# Patient Record
Sex: FEMALE | Race: ASIAN | NOT HISPANIC OR LATINO
[De-identification: names, ages, dates, MRNs, and addresses within clinical notes are randomized per-mention and may not be internally consistent; named-entity substitution may affect disease eponyms.]

---
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
## 2022-01-20 ENCOUNTER — APPOINTMENT (OUTPATIENT)
Dept: GASTROENTEROLOGY | Facility: CLINIC | Age: 22
End: 2022-01-20
Payer: MEDICAID

## 2022-01-20 ENCOUNTER — NON-APPOINTMENT (OUTPATIENT)
Age: 22
End: 2022-01-20

## 2022-01-20 ENCOUNTER — OUTPATIENT (OUTPATIENT)
Dept: OUTPATIENT SERVICES | Facility: HOSPITAL | Age: 22
LOS: 1 days | End: 2022-01-20

## 2022-01-20 VITALS
TEMPERATURE: 97.7 F | OXYGEN SATURATION: 98 % | WEIGHT: 159.81 LBS | BODY MASS INDEX: 28.32 KG/M2 | DIASTOLIC BLOOD PRESSURE: 60 MMHG | HEIGHT: 63 IN | HEART RATE: 94 BPM | SYSTOLIC BLOOD PRESSURE: 100 MMHG

## 2022-01-20 DIAGNOSIS — K85.90 ACUTE PANCREATITIS WITHOUT NECROSIS OR INFECTION, UNSPECIFIED: ICD-10-CM

## 2022-01-20 PROCEDURE — 99205 OFFICE O/P NEW HI 60 MIN: CPT | Mod: GC

## 2022-01-20 NOTE — ASSESSMENT
[FreeTextEntry1] : Impression:\par #Pancreatitis of unclear etiology - No EtOH, no e/o gallstones. There is some association with Adderall and pancreatitis in women aged 10-19. No family history, IgG 4 level wnl.\par #Abd pain - unlikely related to pancreatitis, can consider GERD/PUD or possibly biliary colic (gallstones not seen on U/S)\par \par Recommendations:\par - Schedule EGD/EUS to eval for microlithiasis or sludge and also to eval upper abd pain (H pylori biopsies). Patiente prefers this to MRI/MRCP.\par - Famotidine PRN trial for abd pain.\par - Will consider genetics referral is above negative.\par - Informed patient of association between adderall and pancreatitis and informed her of risk of recurrent pancreatitis if she restarts. She will discuss with her prescribing provider.\par - RTC 8 weeks. \par \par D/w Dr. Jimenez

## 2022-01-20 NOTE — HISTORY OF PRESENT ILLNESS
[Heartburn] : denies heartburn [Nausea] : denies nausea [Vomiting] : denies vomiting [Diarrhea] : denies diarrhea [Constipation] : denies constipation [Yellow Skin Or Eyes (Jaundice)] : denies jaundice [Abdominal Pain] : improved abdominal pain [Abdominal Swelling] : denies abdominal swelling [Rectal Pain] : denies rectal pain [Wt Gain ___ Lbs] : no recent weight gain [Wt Loss ___ Lbs] : no recent weight loss [GERD] : no gastroesophageal reflux disease [Hiatus Hernia] : no hiatus hernia [Peptic Ulcer Disease] : no peptic ulcer disease [Pancreatitis] : no pancreatitis [Cholelithiasis] : no cholelithiasis [Kidney Stone] : no kidney stone [Inflammatory Bowel Disease] : no inflammatory bowel disease [Irritable Bowel Syndrome] : no irritable bowel syndrome [Diverticulitis] : no diverticulitis [Alcohol Abuse] : no alcohol abuse [Malignancy] : no malignancy [Abdominal Surgery] : no abdominal surgery [Appendectomy] : no appendectomy [Cholecystectomy] : no cholecystectomy [de-identified] : SHENA GABRIEL is a 21 year F here after recent admission for pancreatitis. She has a history of ADHD and was previously on adderal and lamotrigine, now no longer of medications.\par \par GI History: Patient had 2 weeks of epigastric pain radiating to the back prior to her hospitalization. There diagnosed w/ pancreatitis. Lipase > 1000. Liver enzymes wnl. CT showed mild ana-pancreatic inflammation in the head. RUQ U/S shows no gallstones or sludge, and normal CBD. \par \par She has no family history of pancreatitis or gallstones/cholecystectomy. She never drinks EtOH. She is not on any other OTC meds. \par \par Current symptoms: Since discharge, she had an episode of mild recurrent pain that went away after stopping eating for a few days. Now she occasionally has post prandial epigastric pain that is worse in the evening and lasts for an hour, a little different than her pancreatitis pain. \par \par GI ROS neg for weight loss, f/c, dysphagia, reflux, nausea, vomiting, early satiety, diarrhea, constipation, melena, hematochezia. Patient denies NSAIDs.\par \par Current Meds: none\par All: NKDA\par FHx: As above\par SHx: As above\par \par Relevant Exam:\par Unremarkable\par

## 2022-01-20 NOTE — END OF VISIT
[] : Fellow [FreeTextEntry3] : 21F ADHD on Adderall/Lamictal with episode of pancreatitis (no alcohol, no stones on imaging, IgG4 level normal, TG normal) \par ?drug induced (case reports with Adderall and pancreatitis) vs structural abnormalities of pancreas\par also with abdominal pains\par 1- EUS/EGD\par 2- Counseled pt on suspicion of possible Adderall induced pancreatitis\par

## 2022-03-22 NOTE — ASU PATIENT PROFILE, ADULT - FALL HARM RISK - UNIVERSAL INTERVENTIONS
Bed in lowest position, wheels locked, appropriate side rails in place/Call bell, personal items and telephone in reach/Instruct patient to call for assistance before getting out of bed or chair/Non-slip footwear when patient is out of bed/[place] to call system/Physically safe environment - no spills, clutter or unnecessary equipment/Purposeful Proactive Rounding/Room/bathroom lighting operational, light cord in reach

## 2022-03-22 NOTE — ASU PATIENT PROFILE, ADULT - NS TRANSFER PATIENT BELONGINGS
Electronic Device (specify) 1 bracelet, 1 necklace/Electronic Device (specify)/Jewelry/Money (specify)/Clothing

## 2022-03-23 ENCOUNTER — OUTPATIENT (OUTPATIENT)
Dept: OUTPATIENT SERVICES | Facility: HOSPITAL | Age: 22
LOS: 1 days | Discharge: HOME | End: 2022-03-23
Payer: MEDICAID

## 2022-03-23 VITALS
RESPIRATION RATE: 14 BRPM | OXYGEN SATURATION: 100 % | HEART RATE: 72 BPM | DIASTOLIC BLOOD PRESSURE: 56 MMHG | SYSTOLIC BLOOD PRESSURE: 94 MMHG

## 2022-03-23 VITALS
HEIGHT: 62 IN | SYSTOLIC BLOOD PRESSURE: 122 MMHG | DIASTOLIC BLOOD PRESSURE: 78 MMHG | HEART RATE: 96 BPM | OXYGEN SATURATION: 99 % | RESPIRATION RATE: 20 BRPM | TEMPERATURE: 98 F | WEIGHT: 164.02 LBS

## 2022-03-23 DIAGNOSIS — K85.90 ACUTE PANCREATITIS WITHOUT NECROSIS OR INFECTION, UNSPECIFIED: ICD-10-CM

## 2022-03-23 LAB — HCG UR QL: NEGATIVE — SIGNIFICANT CHANGE UP

## 2022-03-23 PROCEDURE — 43259 EGD US EXAM DUODENUM/JEJUNUM: CPT | Mod: GC

## 2022-03-23 PROCEDURE — 43239 EGD BIOPSY SINGLE/MULTIPLE: CPT | Mod: 59,GC

## 2022-04-06 ENCOUNTER — APPOINTMENT (OUTPATIENT)
Dept: PEDIATRIC ADOLESCENT MEDICINE | Facility: HOSPITAL | Age: 22
End: 2022-04-06

## 2022-04-06 ENCOUNTER — OUTPATIENT (OUTPATIENT)
Dept: OUTPATIENT SERVICES | Age: 22
LOS: 1 days | End: 2022-04-06

## 2022-04-06 VITALS
TEMPERATURE: 98.3 F | BODY MASS INDEX: 30.11 KG/M2 | HEIGHT: 63 IN | WEIGHT: 169.9 LBS | HEART RATE: 91 BPM | SYSTOLIC BLOOD PRESSURE: 111 MMHG | DIASTOLIC BLOOD PRESSURE: 73 MMHG

## 2022-04-06 DIAGNOSIS — Z23 ENCOUNTER FOR IMMUNIZATION: ICD-10-CM

## 2022-04-06 DIAGNOSIS — H00.019 HORDEOLUM EXTERNUM UNSPECIFIED EYE, UNSPECIFIED EYELID: ICD-10-CM

## 2022-04-06 DIAGNOSIS — B00.9 HERPESVIRAL INFECTION, UNSPECIFIED: ICD-10-CM

## 2022-04-06 PROCEDURE — 90686 IIV4 VACC NO PRSV 0.5 ML IM: CPT

## 2022-04-06 PROCEDURE — 90471 IMMUNIZATION ADMIN: CPT | Mod: 1L

## 2022-04-06 PROCEDURE — 99213 OFFICE O/P EST LOW 20 MIN: CPT | Mod: 25

## 2022-04-06 RX ORDER — POLYMYXIN B SULFATE AND TRIMETHOPRIM 10000; 1 [USP'U]/ML; MG/ML
10000-0.1 SOLUTION OPHTHALMIC
Qty: 1 | Refills: 0 | Status: ACTIVE | COMMUNITY
Start: 2022-04-06 | End: 1900-01-01

## 2022-04-06 RX ORDER — VALACYCLOVIR 1 G/1
1 TABLET, FILM COATED ORAL
Qty: 20 | Refills: 2 | Status: ACTIVE | COMMUNITY
Start: 2017-12-01 | End: 1900-01-01

## 2022-04-08 PROBLEM — B00.9 HSV-1 (HERPES SIMPLEX VIRUS 1) INFECTION: Status: ACTIVE | Noted: 2017-12-01

## 2022-04-08 PROBLEM — Z23 ENCOUNTER FOR IMMUNIZATION: Status: ACTIVE | Noted: 2022-04-06

## 2022-04-08 NOTE — PHYSICAL EXAM
[NL] : clear to auscultation bilaterally [FreeTextEntry5] : right lower eyelash border and inner conjunctiva erythematous nodule  [de-identified] : hyperpigmented macules on lower abdomen and leg

## 2022-04-08 NOTE — HISTORY OF PRESENT ILLNESS
[FreeTextEntry6] : Alexia is a 20yo F with hx here with multiple concerns. \par \par CC: \par right lower lid stye and now she has a lump under her eye a week ago, stye resolving however lump under eye\par She has been using warm compresses. Denies vision changes\par \par Itchy bumpy rash on belly and behind her legs, now resolved after Zyrtec. \par Denies others in the household with same rash. Patient discarded bedding and bed frame, washed everything in hot water  \par \par HSV cold sores during her menses that resolves after 3 days of valacyclovir. Patient requesting refills

## 2022-04-08 NOTE — DISCUSSION/SUMMARY
[FreeTextEntry1] : Right lower lid internal and external hordeolum\par \par \par Plan:\par - Discussed: warm compress to affected eye, gently massage in circular motion\par - Polytrim eye drops - 1 drop to affected eye Q6hrs x 5-7days\par - Reinforced if redness and swelling worsens, unable to open eyes, call office\par

## 2022-11-03 ENCOUNTER — NON-APPOINTMENT (OUTPATIENT)
Age: 22
End: 2022-11-03

## 2022-12-22 ENCOUNTER — APPOINTMENT (OUTPATIENT)
Dept: PEDIATRIC ADOLESCENT MEDICINE | Facility: HOSPITAL | Age: 22
End: 2022-12-22
Payer: MEDICAID

## 2022-12-22 ENCOUNTER — OUTPATIENT (OUTPATIENT)
Dept: OUTPATIENT SERVICES | Age: 22
LOS: 1 days | End: 2022-12-22

## 2022-12-22 VITALS — SYSTOLIC BLOOD PRESSURE: 117 MMHG | WEIGHT: 192 LBS | DIASTOLIC BLOOD PRESSURE: 77 MMHG | HEART RATE: 116 BPM

## 2022-12-22 DIAGNOSIS — L30.4 ERYTHEMA INTERTRIGO: ICD-10-CM

## 2022-12-22 PROCEDURE — 99213 OFFICE O/P EST LOW 20 MIN: CPT

## 2022-12-24 PROBLEM — L30.4 CHAFING: Status: ACTIVE | Noted: 2022-12-24

## 2023-01-04 DIAGNOSIS — L30.4 ERYTHEMA INTERTRIGO: ICD-10-CM

## 2023-03-06 NOTE — PATIENT PROFILE ADULT - FALL HARM RISK - FALL HARM RISK
Writer met with pt in treatment room. Pt is here for initiation of her chemotherapy. Pt reports that continues to be uncomfortable and thus she is relieved to have treatment started. Pt denies additional needs at this time. Coping effectively. Support provided. Will continue to follow   No indicators present

## 2023-07-19 ENCOUNTER — TRANSCRIPTION ENCOUNTER (OUTPATIENT)
Age: 23
End: 2023-07-19

## 2023-09-06 ENCOUNTER — NON-APPOINTMENT (OUTPATIENT)
Age: 23
End: 2023-09-06

## 2023-09-07 ENCOUNTER — APPOINTMENT (OUTPATIENT)
Dept: OTOLARYNGOLOGY | Facility: CLINIC | Age: 23
End: 2023-09-07
Payer: MEDICAID

## 2023-09-07 VITALS — BODY MASS INDEX: 32.25 KG/M2 | HEIGHT: 63 IN | WEIGHT: 182 LBS

## 2023-09-07 DIAGNOSIS — Z87.09 PERSONAL HISTORY OF OTHER DISEASES OF THE RESPIRATORY SYSTEM: ICD-10-CM

## 2023-09-07 DIAGNOSIS — Z83.3 FAMILY HISTORY OF DIABETES MELLITUS: ICD-10-CM

## 2023-09-07 DIAGNOSIS — J30.89 OTHER ALLERGIC RHINITIS: ICD-10-CM

## 2023-09-07 DIAGNOSIS — R43.9 UNSPECIFIED DISTURBANCES OF SMELL AND TASTE: ICD-10-CM

## 2023-09-07 DIAGNOSIS — Z82.49 FAMILY HISTORY OF ISCHEMIC HEART DISEASE AND OTHER DISEASES OF THE CIRCULATORY SYSTEM: ICD-10-CM

## 2023-09-07 DIAGNOSIS — J30.81 ALLERGIC RHINITIS DUE TO ANIMAL (CAT) (DOG) HAIR AND DANDER: ICD-10-CM

## 2023-09-07 DIAGNOSIS — Z78.9 OTHER SPECIFIED HEALTH STATUS: ICD-10-CM

## 2023-09-07 PROCEDURE — 31231 NASAL ENDOSCOPY DX: CPT

## 2023-09-07 PROCEDURE — 99204 OFFICE O/P NEW MOD 45 MIN: CPT | Mod: 25

## 2023-09-07 RX ORDER — METFORMIN HYDROCHLORIDE 500 MG/1
500 TABLET, COATED ORAL
Refills: 0 | Status: ACTIVE | COMMUNITY

## 2023-09-11 PROBLEM — R43.9 SMELL DISORDER: Status: ACTIVE | Noted: 2023-09-11

## 2023-09-13 ENCOUNTER — APPOINTMENT (OUTPATIENT)
Dept: CT IMAGING | Facility: IMAGING CENTER | Age: 23
End: 2023-09-13
Payer: MEDICAID

## 2023-09-13 ENCOUNTER — OUTPATIENT (OUTPATIENT)
Dept: OUTPATIENT SERVICES | Facility: HOSPITAL | Age: 23
LOS: 1 days | End: 2023-09-13
Payer: MEDICAID

## 2023-09-13 DIAGNOSIS — J45.909 UNSPECIFIED ASTHMA, UNCOMPLICATED: ICD-10-CM

## 2023-09-13 PROCEDURE — 70486 CT MAXILLOFACIAL W/O DYE: CPT

## 2023-09-13 PROCEDURE — 70486 CT MAXILLOFACIAL W/O DYE: CPT | Mod: 26

## 2023-09-17 ENCOUNTER — NON-APPOINTMENT (OUTPATIENT)
Age: 23
End: 2023-09-17

## 2023-09-18 ENCOUNTER — APPOINTMENT (OUTPATIENT)
Dept: OTOLARYNGOLOGY | Facility: CLINIC | Age: 23
End: 2023-09-18
Payer: MEDICAID

## 2023-09-18 DIAGNOSIS — Z88.6 UNSPECIFIED ASTHMA, UNCOMPLICATED: ICD-10-CM

## 2023-09-18 DIAGNOSIS — J33.9 UNSPECIFIED ASTHMA, UNCOMPLICATED: ICD-10-CM

## 2023-09-18 DIAGNOSIS — J45.909 UNSPECIFIED ASTHMA, UNCOMPLICATED: ICD-10-CM

## 2023-09-18 PROCEDURE — 99213 OFFICE O/P EST LOW 20 MIN: CPT

## 2023-09-18 RX ORDER — BUDESONIDE 0.5 MG/2ML
0.5 INHALANT ORAL
Qty: 90 | Refills: 3 | Status: ACTIVE | COMMUNITY
Start: 2023-09-18 | End: 1900-01-01

## 2023-09-18 RX ORDER — PREDNISONE 20 MG/1
20 TABLET ORAL
Qty: 20 | Refills: 0 | Status: ACTIVE | COMMUNITY
Start: 2023-09-18 | End: 1900-01-01

## 2023-11-13 ENCOUNTER — APPOINTMENT (OUTPATIENT)
Dept: OTOLARYNGOLOGY | Facility: CLINIC | Age: 23
End: 2023-11-13
Payer: MEDICAID

## 2023-11-13 VITALS — BODY MASS INDEX: 32.25 KG/M2 | HEIGHT: 63 IN | WEIGHT: 182 LBS

## 2023-11-13 DIAGNOSIS — R09.81 NASAL CONGESTION: ICD-10-CM

## 2023-11-13 PROCEDURE — 99214 OFFICE O/P EST MOD 30 MIN: CPT | Mod: 25

## 2023-11-13 PROCEDURE — 31231 NASAL ENDOSCOPY DX: CPT

## 2023-11-24 ENCOUNTER — OUTPATIENT (OUTPATIENT)
Dept: OUTPATIENT SERVICES | Facility: HOSPITAL | Age: 23
LOS: 1 days | End: 2023-11-24
Payer: MEDICAID

## 2023-11-24 ENCOUNTER — APPOINTMENT (OUTPATIENT)
Dept: RADIOLOGY | Facility: IMAGING CENTER | Age: 23
End: 2023-11-24
Payer: MEDICAID

## 2023-11-24 DIAGNOSIS — J32.3 CHRONIC SPHENOIDAL SINUSITIS: ICD-10-CM

## 2023-11-24 PROCEDURE — 71046 X-RAY EXAM CHEST 2 VIEWS: CPT

## 2023-11-24 PROCEDURE — 71046 X-RAY EXAM CHEST 2 VIEWS: CPT | Mod: 26

## 2023-12-21 RX ORDER — CLINDAMYCIN HYDROCHLORIDE 150 MG/1
150 CAPSULE ORAL
Qty: 42 | Refills: 0 | Status: ACTIVE | COMMUNITY
Start: 2023-12-21 | End: 1900-01-01

## 2023-12-21 RX ORDER — PREDNISONE 10 MG/1
10 TABLET ORAL
Qty: 90 | Refills: 0 | Status: ACTIVE | COMMUNITY
Start: 2023-12-21 | End: 1900-01-01

## 2023-12-21 RX ORDER — SULFAMETHOXAZOLE AND TRIMETHOPRIM 800; 160 MG/1; MG/1
800-160 TABLET ORAL TWICE DAILY
Qty: 28 | Refills: 0 | Status: ACTIVE | COMMUNITY
Start: 2023-12-21 | End: 1900-01-01

## 2023-12-21 RX ORDER — OXYCODONE AND ACETAMINOPHEN 5; 325 MG/1; MG/1
5-325 TABLET ORAL
Qty: 30 | Refills: 0 | Status: ACTIVE | COMMUNITY
Start: 2023-12-21 | End: 1900-01-01

## 2023-12-22 ENCOUNTER — APPOINTMENT (OUTPATIENT)
Age: 23
End: 2023-12-22
Payer: MEDICAID

## 2023-12-22 ENCOUNTER — TRANSCRIPTION ENCOUNTER (OUTPATIENT)
Age: 23
End: 2023-12-22

## 2023-12-22 ENCOUNTER — RESULT REVIEW (OUTPATIENT)
Age: 23
End: 2023-12-22

## 2023-12-22 PROCEDURE — 30930 THER FX NASAL INF TURBINATE: CPT

## 2023-12-22 PROCEDURE — 31276 NSL/SINS NDSC FRNT TISS RMVL: CPT | Mod: 50

## 2023-12-22 PROCEDURE — 61782 SCAN PROC CRANIAL EXTRA: CPT

## 2023-12-22 PROCEDURE — 30520 REPAIR OF NASAL SEPTUM: CPT

## 2023-12-22 PROCEDURE — 31267 ENDOSCOPY MAXILLARY SINUS: CPT | Mod: 50

## 2023-12-22 PROCEDURE — 31259 NSL/SINS NDSC SPHN TISS RMVL: CPT

## 2023-12-28 ENCOUNTER — APPOINTMENT (OUTPATIENT)
Dept: OTOLARYNGOLOGY | Facility: CLINIC | Age: 23
End: 2023-12-28
Payer: MEDICAID

## 2023-12-28 PROCEDURE — 31237 NSL/SINS NDSC SURG BX POLYPC: CPT | Mod: 50,79

## 2023-12-28 NOTE — PROCEDURE
[FreeTextEntry3] : HISTORY OF PRESENT ILLNESS Ms. Moran is a pleasant 23 year old lady who is s/p septoplasty bFESS on 12/22/2023. Currently on pred20/clinda/bactrim/NSI. Complaints: congestion   Physical Exam General: AAO x 3, WDWN Ears: Canals clear, TM;s nrml Nose: See endoscopy Throat: uvula midline. No masses or lesions Eyes: PERRL, EOMI Neuro: Gross nrml, CN 2-12 intact Resp: Nrml chest excursion Skin: Appears intact   Procedure Note   PROCEDURE: Nasal/sinus endoscopy, surgical, with debridement (32853-91-08)   INDICATION:   Prior to the procedure, I had a discussion with the patient regarding the risks, benefits, and alternatives of the debridement and they signed a consent.   SURGEON: Dr. Buddy Yang   PROCEDURE DETAIL: After obtaining adequate decongestion of the nasal mucosa with ephedrine nasal spray, and adequate anesthesia with 2% topical Lidocaine nasal spray, the nasal endoscope was used to examine the nasal passages and paranasal sinuses. Using a combination of suction, grasping instruments and swabs, the maxillary, ethmoid, frontal and sphenoid sinuses were debrided bilaterally of pus, crust, debris, clots and polyps to prevent scar formation, continued sinusitis and mucocele formation. At the end of this procedure, all operated sinuses were patent. The endoscope was withdrawn, and the patient tolerated the procedure well. No complications were encountered.   INSTRUMENTS: rigid 45   ENDOSCOPIC FINDINGS: doyles removed. extensive debris removed bilaterally. all operated sinuses widely patent   Impression: doing well   Plan: -Complete course of antibiotics -Continue 20mg of prednisone -Continue nasal irrigations with budesonide -Return to clinic in 1 week.   The patient was given an opportunity to ask questions, and all questions asked were answered to the best of my ability.   Buddy Yang MD

## 2024-01-01 ENCOUNTER — EMERGENCY (EMERGENCY)
Facility: HOSPITAL | Age: 24
LOS: 1 days | Discharge: HOME | End: 2024-01-01
Attending: STUDENT IN AN ORGANIZED HEALTH CARE EDUCATION/TRAINING PROGRAM | Admitting: STUDENT IN AN ORGANIZED HEALTH CARE EDUCATION/TRAINING PROGRAM
Payer: MEDICAID

## 2024-01-01 VITALS
OXYGEN SATURATION: 99 % | RESPIRATION RATE: 16 BRPM | HEART RATE: 98 BPM | DIASTOLIC BLOOD PRESSURE: 76 MMHG | SYSTOLIC BLOOD PRESSURE: 120 MMHG | TEMPERATURE: 99 F

## 2024-01-01 VITALS
HEART RATE: 124 BPM | RESPIRATION RATE: 18 BRPM | OXYGEN SATURATION: 96 % | DIASTOLIC BLOOD PRESSURE: 81 MMHG | TEMPERATURE: 99 F | SYSTOLIC BLOOD PRESSURE: 148 MMHG

## 2024-01-01 LAB
ALBUMIN SERPL ELPH-MCNC: 4.1 G/DL — SIGNIFICANT CHANGE UP (ref 3.3–5)
ALBUMIN SERPL ELPH-MCNC: 4.1 G/DL — SIGNIFICANT CHANGE UP (ref 3.3–5)
ALP SERPL-CCNC: 73 U/L — SIGNIFICANT CHANGE UP (ref 40–120)
ALP SERPL-CCNC: 73 U/L — SIGNIFICANT CHANGE UP (ref 40–120)
ALT FLD-CCNC: 32 U/L — SIGNIFICANT CHANGE UP (ref 4–33)
ALT FLD-CCNC: 32 U/L — SIGNIFICANT CHANGE UP (ref 4–33)
ANION GAP SERPL CALC-SCNC: 13 MMOL/L — SIGNIFICANT CHANGE UP (ref 7–14)
ANION GAP SERPL CALC-SCNC: 13 MMOL/L — SIGNIFICANT CHANGE UP (ref 7–14)
AST SERPL-CCNC: 18 U/L — SIGNIFICANT CHANGE UP (ref 4–32)
AST SERPL-CCNC: 18 U/L — SIGNIFICANT CHANGE UP (ref 4–32)
BASOPHILS # BLD AUTO: 0.04 K/UL — SIGNIFICANT CHANGE UP (ref 0–0.2)
BASOPHILS # BLD AUTO: 0.04 K/UL — SIGNIFICANT CHANGE UP (ref 0–0.2)
BASOPHILS NFR BLD AUTO: 0.2 % — SIGNIFICANT CHANGE UP (ref 0–2)
BASOPHILS NFR BLD AUTO: 0.2 % — SIGNIFICANT CHANGE UP (ref 0–2)
BILIRUB SERPL-MCNC: 0.2 MG/DL — SIGNIFICANT CHANGE UP (ref 0.2–1.2)
BILIRUB SERPL-MCNC: 0.2 MG/DL — SIGNIFICANT CHANGE UP (ref 0.2–1.2)
BUN SERPL-MCNC: 15 MG/DL — SIGNIFICANT CHANGE UP (ref 7–23)
BUN SERPL-MCNC: 15 MG/DL — SIGNIFICANT CHANGE UP (ref 7–23)
CALCIUM SERPL-MCNC: 8.7 MG/DL — SIGNIFICANT CHANGE UP (ref 8.4–10.5)
CALCIUM SERPL-MCNC: 8.7 MG/DL — SIGNIFICANT CHANGE UP (ref 8.4–10.5)
CHLORIDE SERPL-SCNC: 100 MMOL/L — SIGNIFICANT CHANGE UP (ref 98–107)
CHLORIDE SERPL-SCNC: 100 MMOL/L — SIGNIFICANT CHANGE UP (ref 98–107)
CO2 SERPL-SCNC: 25 MMOL/L — SIGNIFICANT CHANGE UP (ref 22–31)
CO2 SERPL-SCNC: 25 MMOL/L — SIGNIFICANT CHANGE UP (ref 22–31)
CREAT SERPL-MCNC: 0.76 MG/DL — SIGNIFICANT CHANGE UP (ref 0.5–1.3)
CREAT SERPL-MCNC: 0.76 MG/DL — SIGNIFICANT CHANGE UP (ref 0.5–1.3)
EGFR: 114 ML/MIN/1.73M2 — SIGNIFICANT CHANGE UP
EGFR: 114 ML/MIN/1.73M2 — SIGNIFICANT CHANGE UP
EOSINOPHIL # BLD AUTO: 0.57 K/UL — HIGH (ref 0–0.5)
EOSINOPHIL # BLD AUTO: 0.57 K/UL — HIGH (ref 0–0.5)
EOSINOPHIL NFR BLD AUTO: 3.2 % — SIGNIFICANT CHANGE UP (ref 0–6)
EOSINOPHIL NFR BLD AUTO: 3.2 % — SIGNIFICANT CHANGE UP (ref 0–6)
GLUCOSE SERPL-MCNC: 97 MG/DL — SIGNIFICANT CHANGE UP (ref 70–99)
GLUCOSE SERPL-MCNC: 97 MG/DL — SIGNIFICANT CHANGE UP (ref 70–99)
HCT VFR BLD CALC: 39 % — SIGNIFICANT CHANGE UP (ref 34.5–45)
HCT VFR BLD CALC: 39 % — SIGNIFICANT CHANGE UP (ref 34.5–45)
HGB BLD-MCNC: 12.8 G/DL — SIGNIFICANT CHANGE UP (ref 11.5–15.5)
HGB BLD-MCNC: 12.8 G/DL — SIGNIFICANT CHANGE UP (ref 11.5–15.5)
IANC: 12.66 K/UL — HIGH (ref 1.8–7.4)
IANC: 12.66 K/UL — HIGH (ref 1.8–7.4)
IMM GRANULOCYTES NFR BLD AUTO: 0.8 % — SIGNIFICANT CHANGE UP (ref 0–0.9)
IMM GRANULOCYTES NFR BLD AUTO: 0.8 % — SIGNIFICANT CHANGE UP (ref 0–0.9)
LYMPHOCYTES # BLD AUTO: 18.1 % — SIGNIFICANT CHANGE UP (ref 13–44)
LYMPHOCYTES # BLD AUTO: 18.1 % — SIGNIFICANT CHANGE UP (ref 13–44)
LYMPHOCYTES # BLD AUTO: 3.24 K/UL — SIGNIFICANT CHANGE UP (ref 1–3.3)
LYMPHOCYTES # BLD AUTO: 3.24 K/UL — SIGNIFICANT CHANGE UP (ref 1–3.3)
MCHC RBC-ENTMCNC: 27.9 PG — SIGNIFICANT CHANGE UP (ref 27–34)
MCHC RBC-ENTMCNC: 27.9 PG — SIGNIFICANT CHANGE UP (ref 27–34)
MCHC RBC-ENTMCNC: 32.8 GM/DL — SIGNIFICANT CHANGE UP (ref 32–36)
MCHC RBC-ENTMCNC: 32.8 GM/DL — SIGNIFICANT CHANGE UP (ref 32–36)
MCV RBC AUTO: 85.2 FL — SIGNIFICANT CHANGE UP (ref 80–100)
MCV RBC AUTO: 85.2 FL — SIGNIFICANT CHANGE UP (ref 80–100)
MONOCYTES # BLD AUTO: 1.24 K/UL — HIGH (ref 0–0.9)
MONOCYTES # BLD AUTO: 1.24 K/UL — HIGH (ref 0–0.9)
MONOCYTES NFR BLD AUTO: 6.9 % — SIGNIFICANT CHANGE UP (ref 2–14)
MONOCYTES NFR BLD AUTO: 6.9 % — SIGNIFICANT CHANGE UP (ref 2–14)
NEUTROPHILS # BLD AUTO: 12.66 K/UL — HIGH (ref 1.8–7.4)
NEUTROPHILS # BLD AUTO: 12.66 K/UL — HIGH (ref 1.8–7.4)
NEUTROPHILS NFR BLD AUTO: 70.8 % — SIGNIFICANT CHANGE UP (ref 43–77)
NEUTROPHILS NFR BLD AUTO: 70.8 % — SIGNIFICANT CHANGE UP (ref 43–77)
NRBC # BLD: 0 /100 WBCS — SIGNIFICANT CHANGE UP (ref 0–0)
NRBC # BLD: 0 /100 WBCS — SIGNIFICANT CHANGE UP (ref 0–0)
NRBC # FLD: 0 K/UL — SIGNIFICANT CHANGE UP (ref 0–0)
NRBC # FLD: 0 K/UL — SIGNIFICANT CHANGE UP (ref 0–0)
PLATELET # BLD AUTO: 435 K/UL — HIGH (ref 150–400)
PLATELET # BLD AUTO: 435 K/UL — HIGH (ref 150–400)
POTASSIUM SERPL-MCNC: 4.2 MMOL/L — SIGNIFICANT CHANGE UP (ref 3.5–5.3)
POTASSIUM SERPL-MCNC: 4.2 MMOL/L — SIGNIFICANT CHANGE UP (ref 3.5–5.3)
POTASSIUM SERPL-SCNC: 4.2 MMOL/L — SIGNIFICANT CHANGE UP (ref 3.5–5.3)
POTASSIUM SERPL-SCNC: 4.2 MMOL/L — SIGNIFICANT CHANGE UP (ref 3.5–5.3)
PROT SERPL-MCNC: 7.7 G/DL — SIGNIFICANT CHANGE UP (ref 6–8.3)
PROT SERPL-MCNC: 7.7 G/DL — SIGNIFICANT CHANGE UP (ref 6–8.3)
RBC # BLD: 4.58 M/UL — SIGNIFICANT CHANGE UP (ref 3.8–5.2)
RBC # BLD: 4.58 M/UL — SIGNIFICANT CHANGE UP (ref 3.8–5.2)
RBC # FLD: 13.2 % — SIGNIFICANT CHANGE UP (ref 10.3–14.5)
RBC # FLD: 13.2 % — SIGNIFICANT CHANGE UP (ref 10.3–14.5)
SODIUM SERPL-SCNC: 138 MMOL/L — SIGNIFICANT CHANGE UP (ref 135–145)
SODIUM SERPL-SCNC: 138 MMOL/L — SIGNIFICANT CHANGE UP (ref 135–145)
WBC # BLD: 17.89 K/UL — HIGH (ref 3.8–10.5)
WBC # BLD: 17.89 K/UL — HIGH (ref 3.8–10.5)
WBC # FLD AUTO: 17.89 K/UL — HIGH (ref 3.8–10.5)
WBC # FLD AUTO: 17.89 K/UL — HIGH (ref 3.8–10.5)

## 2024-01-01 PROCEDURE — 99284 EMERGENCY DEPT VISIT MOD MDM: CPT

## 2024-01-01 PROCEDURE — 93010 ELECTROCARDIOGRAM REPORT: CPT

## 2024-01-01 RX ORDER — FAMOTIDINE 10 MG/ML
60 INJECTION INTRAVENOUS ONCE
Refills: 0 | Status: DISCONTINUED | OUTPATIENT
Start: 2024-01-01 | End: 2024-01-01

## 2024-01-01 RX ORDER — DIPHENHYDRAMINE HCL 50 MG
50 CAPSULE ORAL ONCE
Refills: 0 | Status: COMPLETED | OUTPATIENT
Start: 2024-01-01 | End: 2024-01-01

## 2024-01-01 RX ORDER — SODIUM CHLORIDE 9 MG/ML
1000 INJECTION INTRAMUSCULAR; INTRAVENOUS; SUBCUTANEOUS ONCE
Refills: 0 | Status: COMPLETED | OUTPATIENT
Start: 2024-01-01 | End: 2024-01-01

## 2024-01-01 RX ADMIN — SODIUM CHLORIDE 1000 MILLILITER(S): 9 INJECTION INTRAMUSCULAR; INTRAVENOUS; SUBCUTANEOUS at 12:07

## 2024-01-01 RX ADMIN — Medication 50 MILLIGRAM(S): at 12:07

## 2024-01-01 RX ADMIN — Medication 60 MILLIGRAM(S): at 12:07

## 2024-01-01 NOTE — ED PROVIDER NOTE - PATIENT PORTAL LINK FT
You can access the FollowMyHealth Patient Portal offered by [facility] by registering at the following website: [email or web address]. By joining [employer]’s FollowMyHealth portal, you will also be able to view your health information using other applications (apps) compatible with our system. You can access the FollowMyHealth Patient Portal offered by [facility] by registering at the following website: [email or web address]. By joining [employer]’s FollowMyHealth portal, you will also be able to view your health information using other applications (apps) compatible with our system.

## 2024-01-01 NOTE — ED ADULT NURSE NOTE - OBJECTIVE STATEMENT
Pt awake and alert x 4 arrives with co rash that she noticed yesterday with itchiness. pt with no co sob , no co fever. iv placed medicated for her symptoms.

## 2024-01-01 NOTE — ED ADULT TRIAGE NOTE - CHIEF COMPLAINT QUOTE
pt c/o rash with itching to both arms since yesterday.  pt is on clinda and bactrim s/p sinus surgery 12/22.  pt breathing even and unlabored pt c/o rash with itching to both arms since yesterday.  pt is on clinda, prednisone and bactrim s/p sinus surgery 12/22.  pt breathing even and unlabored

## 2024-01-01 NOTE — ED ADULT NURSE NOTE - CHIEF COMPLAINT QUOTE
pt c/o rash with itching to both arms since yesterday.  pt is on clinda, prednisone and bactrim s/p sinus surgery 12/22.  pt breathing even and unlabored

## 2024-01-01 NOTE — ED PROVIDER NOTE - OBJECTIVE STATEMENT
24 yo F PMH ADHD, PCOS, Chronic Sinusitis p/w increasing rash and itching on b/l arms, back, abdomen, inner thigh since yesterday. S/p sinus surgery 12/22 and started on 12/21 (Clindamycin 150, Prednisone 20mg and Bactrim 800-160) to be completed this upcoming Thursday. Allergies to NSAIDs, tree cats, dust. LMP 12/26. Taken clindamycin and prednisone before without similar reaction. Denies fever, headache, dizziness, chills, chest pain, shortness of breath, abdominal pain, sick contact or recent travel. Denies alcohol use or other drugs. 22 yo F PMH ADHD, PCOS, Chronic Sinusitis p/w increasing rash and itching on b/l arms, back, abdomen, inner thigh since yesterday. S/p sinus surgery 12/22 and started on 12/21 (Clindamycin 150, Prednisone 20mg and Bactrim 800-160) to be completed this upcoming Thursday. Allergies to NSAIDs, tree cats, dust. LMP 12/26. Taken clindamycin and prednisone before without similar reaction. Denies fever, headache, dizziness, chills, chest pain, shortness of breath, abdominal pain, sick contact or recent travel. Denies alcohol use or other drugs.

## 2024-01-01 NOTE — ED PROVIDER NOTE - CLINICAL SUMMARY MEDICAL DECISION MAKING FREE TEXT BOX
24 yo F PMH ADHD, PCOS, Chronic Sinusitis p/w rash b/l arms, back, abdomen, inner thigh since yesterday. S/p sinus surgery 12/22 and started on 12/21 (Clindamycin 150, Prednisone 20mg and Bactrim 800-160) to be completed this upcoming Thursday.   Drug Induced Rash vs. Dermatitis  Labs, IV fluids, EKG  Dispo as per 22 yo F PMH ADHD, PCOS, Chronic Sinusitis p/w rash b/l arms, back, abdomen, inner thigh since yesterday. S/p sinus surgery 12/22 and started on 12/21 (Clindamycin 150, Prednisone 20mg and Bactrim 800-160) to be completed this upcoming Thursday.   Drug Induced Rash vs. Dermatitis  Labs, IV fluids, EKG  Dispo as per

## 2024-01-01 NOTE — ED ADULT NURSE NOTE - NS ED NOTE ABUSE SUSPICION NEGLECT YN
Mother informed of Dr Brown's recommendations to switching milk and expressed understanding.    Mother will also be sending a picture of the diaper rash thru patient portal for Dr. Brown to review.   No

## 2024-01-01 NOTE — ED PROVIDER NOTE - NSFOLLOWUPINSTRUCTIONS_ED_ALL_ED_FT
Drug Rash  Close-up of red and inflamed skin around a bandage after an injection.   A drug rash occurs when a medicine causes a change in the color or texture of the skin. It can develop minutes, hours, or days after you take the medicine. The rash may appear on a small area of skin or all over your body.    What are the causes?  This condition may be caused by one of these three conditions:  An allergic reaction to the medicine.  An unwanted side effect of a certain medicine.  Extreme sensitivity to sunlight caused by the medicine.  What increases the risk?  If you take any of these medicines that make your skin sensitive to light and are exposed to sunlight, it can make you more likely to develop this condition:  Antibiotics, including tetracyclines and sulfa medicines.  Antifungals.  Antihistamines.  Diuretics.  Retinoids, such as isotretinoin.  Statins.  NSAIDs.  What are the signs or symptoms?  A person scratching their arm.  Symptoms of this condition include:  Redness.  Tiny bumps.  Peeling.  Itching.  Itchy welts (hives).  Swelling.  How is this diagnosed?  This condition may be diagnosed based on:  A physical exam.  Tests to find out which medicine caused the rash. These tests may include:  Skin tests.  Blood tests.  How is this treated?  This condition is treated with medicines, including:  Antihistamine. This may be given to relieve itching.  NSAIDs. These may be given to reduce swelling and to treat pain.  A steroid medicine. This may be given to reduce swelling.  The rash usually goes away when you stop taking the medicine that caused it.    Follow these instructions at home:  Take over-the-counter and prescription medicines only as told by your health care provider.  Tell all your health care providers about any medicine reactions that you have had in the past.  If your rash was caused by sensitivity to sunlight, and while your rash is healing:  Avoid being in the sun if possible, especially when it is strongest, usually between 10 a.m. and 4 p.m.  Cover your skin with pants, long sleeves, and a hat when you are exposed to sunlight.  If you have hives:  Take a cool shower or use a cool compress to relieve itchiness.  Take over-the-counter antihistamines, as recommended by your health care provider, until the hives are gone. Hives are not contagious.  Keep all follow-up visits. This is important.  Contact a health care provider if:  You have fever.  Your rash is not going away.  Your rash gets worse.  Your rash comes back.  You have high-pitched whistling sounds when you breathe, most often when you breathe out (wheezing) or coughing.  Get help right away if:  You start to have breathing problems.  You start to have shortness of breath.  Your face or throat starts to swell.  You have severe weakness with dizziness or fainting.  You have chest pain.  Your skin starts to blister and peel.  These symptoms may represent a serious problem that is an emergency. Do not wait to see if the symptoms will go away. Get medical help right away. Call your local emergency services (911 in the [place] Do not drive yourself to the hospital.    Summary  A drug rash occurs when a medicine causes a change in the color or texture of the skin. The rash may appear on a small area of skin or all over your body.  It can develop minutes, hours, or days after you take the medicine.  Your health care provider will do various tests to determine what medicine caused your rash.  The rash may be treated with medicine to relieve itching, swelling, and pain.  This information is not intended to replace advice given to you by your health care provider. Make sure you discuss any questions you have with your health care provider.

## 2024-01-01 NOTE — ED ADULT NURSE NOTE - NSFALLUNIVINTERV_ED_ALL_ED
Bed/Stretcher in lowest position, wheels locked, appropriate side rails in place/Call bell, personal items and telephone in reach/Instruct patient to call for assistance before getting out of bed/chair/stretcher/Non-slip footwear applied when patient is off stretcher/[place] to call system/Physically safe environment - no spills, clutter or unnecessary equipment/Purposeful proactive rounding/Room/bathroom lighting operational, light cord in reach Bed/Stretcher in lowest position, wheels locked, appropriate side rails in place/Call bell, personal items and telephone in reach/Instruct patient to call for assistance before getting out of bed/chair/stretcher/Non-slip footwear applied when patient is off stretcher/[place] to call system/Physically safe environment - no spills, clutter or unnecessary equipment/Purposeful proactive rounding/Room/bathroom lighting operational, light cord in reach

## 2024-01-01 NOTE — ED PROVIDER NOTE - NS ED ATTENDING STATEMENT MOD
This was a shared visit with the SONU. I reviewed and verified the documentation and independently performed the documented:

## 2024-01-01 NOTE — ED PROVIDER NOTE - ATTENDING APP SHARED VISIT CONTRIBUTION OF CARE
Star Wedge Flap Text: The defect edges were debeveled with a #15 scalpel blade.  Given the location of the defect, shape of the defect and the proximity to free margins a star wedge flap was deemed most appropriate.  Using a sterile surgical marker, an appropriate rotation flap was drawn incorporating the defect and placing the expected incisions within the relaxed skin tension lines where possible. The area thus outlined was incised deep to adipose tissue with a #15 scalpel blade.  The skin margins were undermined to an appropriate distance in all directions utilizing iris scissors. I have seen and examined the patient face to face, have reviewed and addended the HPI, PE and a/p as necessary.

## 2024-01-01 NOTE — ED PROVIDER NOTE - NSTIMEPROVIDERCAREINITIATE_GEN_ER
Dr Fortune, pt was unable to afford farxiga therefore is not taking.  Please advise.  01-Jan-2024 11:14

## 2024-01-02 ENCOUNTER — NON-APPOINTMENT (OUTPATIENT)
Age: 24
End: 2024-01-02

## 2024-01-03 ENCOUNTER — APPOINTMENT (OUTPATIENT)
Dept: OTOLARYNGOLOGY | Facility: CLINIC | Age: 24
End: 2024-01-03
Payer: MEDICAID

## 2024-01-03 VITALS
HEIGHT: 63 IN | BODY MASS INDEX: 32.78 KG/M2 | HEART RATE: 109 BPM | DIASTOLIC BLOOD PRESSURE: 89 MMHG | WEIGHT: 185 LBS | SYSTOLIC BLOOD PRESSURE: 127 MMHG | TEMPERATURE: 97.9 F | OXYGEN SATURATION: 98 %

## 2024-01-03 PROBLEM — F90.9 ATTENTION-DEFICIT HYPERACTIVITY DISORDER, UNSPECIFIED TYPE: Chronic | Status: ACTIVE | Noted: 2024-01-01

## 2024-01-03 PROBLEM — J32.9 CHRONIC SINUSITIS, UNSPECIFIED: Chronic | Status: ACTIVE | Noted: 2024-01-01

## 2024-01-03 PROBLEM — Z87.42 PERSONAL HISTORY OF OTHER DISEASES OF THE FEMALE GENITAL TRACT: Chronic | Status: ACTIVE | Noted: 2024-01-01

## 2024-01-03 PROCEDURE — 31237 NSL/SINS NDSC SURG BX POLYPC: CPT | Mod: 50,79

## 2024-01-03 NOTE — PROCEDURE
[FreeTextEntry3] : HISTORY OF PRESENT ILLNESS Ms. Moran is a pleasant 23 year old lady who is s/p septoplasty bFESS on 12/22/2023. Currently on pred20/budesonide. Complaints: congestion improving. developed rash with bactrim, stopped all abx   Physical Exam General: AAO x 3, WDWN Ears: Canals clear, TM;s nrml Nose: See endoscopy Throat: uvula midline. No masses or lesions Eyes: PERRL, EOMI Neuro: Gross nrml, CN 2-12 intact Resp: Nrml chest excursion Skin: Appears intact   Procedure Note   PROCEDURE: Nasal/sinus endoscopy, surgical, with debridement (63651-11-16)   INDICATION:   Prior to the procedure, I had a discussion with the patient regarding the risks, benefits, and alternatives of the debridement and they signed a consent.   SURGEON: Dr. Buddy Yang   PROCEDURE DETAIL: After obtaining adequate decongestion of the nasal mucosa with ephedrine nasal spray, and adequate anesthesia with 2% topical Lidocaine nasal spray, the nasal endoscope was used to examine the nasal passages and paranasal sinuses. Using a combination of suction, grasping instruments and swabs, the maxillary, ethmoid, frontal and sphenoid sinuses were debrided bilaterally of pus, crust, debris, clots and polyps to prevent scar formation, continued sinusitis and mucocele formation. At the end of this procedure, all operated sinuses were patent. The endoscope was withdrawn, and the patient tolerated the procedure well. No complications were encountered.   INSTRUMENTS: rigid 45   ENDOSCOPIC FINDINGS: extensive debris removed bilaterally. extensive polypoid edema. all operated sinuses widely patent   Impression: doing well   Plan: -rash improving -taper pred to 10mg daily for a week, 5mg for a week then off -Continue nasal irrigations with budesonide -Return to clinic in 2 week.   The patient was given an opportunity to ask questions, and all questions asked were answered to the best of my ability.   Buddy Yang MD

## 2024-01-17 ENCOUNTER — APPOINTMENT (OUTPATIENT)
Dept: OTOLARYNGOLOGY | Facility: CLINIC | Age: 24
End: 2024-01-17
Payer: MEDICAID

## 2024-01-17 VITALS
TEMPERATURE: 97.8 F | SYSTOLIC BLOOD PRESSURE: 123 MMHG | WEIGHT: 185 LBS | BODY MASS INDEX: 32.78 KG/M2 | OXYGEN SATURATION: 97 % | HEART RATE: 109 BPM | HEIGHT: 63 IN | DIASTOLIC BLOOD PRESSURE: 85 MMHG

## 2024-01-17 PROCEDURE — 31237 NSL/SINS NDSC SURG BX POLYPC: CPT | Mod: 50,79

## 2024-01-18 NOTE — PROCEDURE
[FreeTextEntry3] : HISTORY OF PRESENT ILLNESS 23 year old female who is s/p bFESS on 12/22/2023 . Currently on budesonide. Complaints: lost bottle of prednisone so she's been off pred.   Physical Exam General: AAO x 3, WDWN Ears: Canals clear, TM;s nrml Nose: See endoscopy Throat: uvula midline. No masses or lesions Eyes: PERRL, EOMI Neuro: Gross nrml, CN 2-12 intact Resp: Nrml chest excursion Skin: Appears intact   Procedure Note   PROCEDURE: Nasal/sinus endoscopy, surgical, with debridement (17968)   INDICATION:   Prior to the procedure, I had a discussion with the patient regarding the risks, benefits, and alternatives of the debridement and they signed a consent.   SURGEON: Dr. Buddy Yang   PROCEDURE DETAIL: After obtaining adequate decongestion of the nasal mucosa with ephedrine nasal spray, and adequate anesthesia with 2% topical Lidocaine nasal spray, the nasal endoscope was used to examine the nasal passages and paranasal sinuses. Using a combination of suction, grasping instruments and swabs, the maxillary, ethmoid, frontal and sphenoid sinuses were debrided bilaterally of pus, crust, debris, clots and polyps to prevent scar formation, continued sinusitis and mucocele formation. At the end of this procedure, all operated sinuses were patent. The endoscope was withdrawn, and the patient tolerated the procedure well. No complications were encountered.   INSTRUMENTS:  rigid 45   ENDOSCOPIC FINDINGS: scant debris removed bilaterally, mild skull base edema, all operated sinuses widely patent  Impression: doing well, irrigating well  Plan: -Continue nasal irrigations with saline and budesonide  -Return to clinic in 6 weeks   The patient was given an opportunity to ask questions, and all questions asked were answered to the best of my ability.   Buddy Yang MD

## 2024-03-13 ENCOUNTER — APPOINTMENT (OUTPATIENT)
Dept: OTOLARYNGOLOGY | Facility: CLINIC | Age: 24
End: 2024-03-13
Payer: MEDICAID

## 2024-03-13 VITALS
SYSTOLIC BLOOD PRESSURE: 128 MMHG | WEIGHT: 185 LBS | TEMPERATURE: 97.9 F | DIASTOLIC BLOOD PRESSURE: 93 MMHG | BODY MASS INDEX: 32.78 KG/M2 | OXYGEN SATURATION: 97 % | HEIGHT: 63 IN | HEART RATE: 94 BPM

## 2024-03-13 PROCEDURE — 31231 NASAL ENDOSCOPY DX: CPT | Mod: 58

## 2024-03-13 PROCEDURE — 99214 OFFICE O/P EST MOD 30 MIN: CPT | Mod: 25

## 2024-03-13 NOTE — HISTORY OF PRESENT ILLNESS
[de-identified] : CC: possible AERD  HISTORY OF PRESENT ILLNESS: Ms. Moran is a pleasant 22 year old lady with possible AERD she has had issues for over 6 years hx of DM2 on metformin, and of note, she had a reaction to ibuprofen with throat closing also allergic to animal dander, pollen, dust. does not drink alcohol she has severe bilateral congestion, PND, loss of smell, and has trouble sleeping she denies significant shortness of breath she has tried nasal saline rinses with some efficacy   Environmental Allergies: yes Immunotherapy: no Smoker: no Asthma: no ASA sensitivity: YES History of Autoimmune Disease: No History of Immune Deficiency: No  Key Elements at least 4 described: Location: bilateral Severity: severe Quality: congestion Timing: constant Duration: years Modifying Factors: none Associated signs and symptoms: see above  2 month follow up s/p pred60 taper, budesonide felt better with budesonide but only last an hour still have significant congestion CT reviewed, DNS, patchy disease  3 mo f/u s/p bFESS on 12/22/2023 she does report some congestion and tonsilitis. she went to UC 2 weeks ago who gave her abx which improved her symptoms she has been less consistent with the budesonide overall doing well, smell has returned and better than preop  REVIEW OF SYSTEMS:  General ROS: negative for - chills, fatigue or fever Psychological ROS: negative for - anxiety or depression Ophthalmic ROS: negative for - blurry vision, decreased vision or double vision  ENT ROS: negative except as noted from HPI Allergy and Immunology ROS: negative except as noted from HPI Hematological and Lymphatic ROS: negative for - bleeding problems  Endocrine ROS: negative for - malaise/lethargy Respiratory ROS: negative for - stridor Cardiovascular ROS: negative for - chest pain Gastrointestinal ROS: negative for - appetite loss or nausea/vomiting Genitourinary ROS: negative for - incontinence Musculoskeletal ROS: negative for - gait disturbance  Neurological ROS: negative for - behavioral changes Dermatological ROS: negative for - nail changes  Physical Exam:  GENERAL APPEARANCE: Well-developed and No Acute Distress. COMMUNICATION: Able to Communicate. Strong Voice.  HEAD AND FACE Eyes: Testing of ocular motility including primary gaze alignment normal. Inspection and Appearance: No evidence of lesions or masses Palpation: Palpation of the face reveals no sinus tenderness Salivary Glands: Symmetric without masses Facial Strength: Symmetric without evidence of facial paralysis  EAR, NOSE, MOUTH, and THROAT: Ear Canals and Tympanic Membranes, Bilateral: No evidence of inflammation or lesions. Thresholds: Clinical speech reception thresholds normal. External, Nose and Auricle: No lesions or masses. Nasal, Mucosa, Septum, and Turbinates: See endoscopy.  NECK: Evaluation: No evidence of masses or crepitus. The neck is symmetric and the trachea is in the midline position. Thyroid: No evidence of enlargement, tenderness or mass. Neck Lymph Nodes: WNL. Respiratory: Inspection of the chest including symmetry, expansion and/or assessment of respiratory effort normal. Cardiovascular: Evaluation of peripheral vascular system by observation and palpation of capillary refill, normal. Neurological/Psychiatric: Alert, Oriented, Mood, and Affect Normal.  IMAGING:    PROCEDURE: Nasal endoscopy ([id number])   SURGEON: Buddy Yang MD   Prior to the procedure, I had a discussion with the patient regarding the risks, benefits, and alternatives of the procedure and a verbal consent was obtained.   After obtaining adequate decongestion of the nasal mucosa with topical Epinephrine and adequate anesthesia with topical Lidocaine nasal spray, the nasal endoscope was used to examine the nasal passages and paranasal sinuses. The endoscope was passed along the floor of the nose bilaterally to evaluate the inferior meatus, floor of the nose, inferior turbinate, and nasopharynx. The scope was then passed superiorly to evaluate the area of the sphenoethmoidal recess, superior turbinate and superior meatus. As the scope was withdrawn anteriorly, the middle turbinate and middle meatus were carefully inspected. The endoscope was withdrawn and the patient tolerated the procedure well. No complications were encountered.   INSTRUMENTS: rigid 45   EXAM FINDINGS: very mild edema, all operated sinuses widely patent no polyps or mucopurulence  IMPRESSION: Ms. Moran is a pleasant 22 year old lady with NSAID sensitivity, DNS, CRSsNP, possible AERD s/p bFESS on 12/22/2023  PLAN: - continue NSI w/ budesonide daily  - RTC in 3 mo  Buddy Yang MD [facility] Rhinology and Skull Base Surgery Department of Otolaryngology- Head and Neck Surgery [facility]

## 2024-03-13 NOTE — HISTORY OF PRESENT ILLNESS
[de-identified] : CC: possible AERD  HISTORY OF PRESENT ILLNESS: Ms. Moran is a pleasant 22 year old lady with possible AERD she has had issues for over 6 years hx of DM2 on metformin, and of note, she had a reaction to ibuprofen with throat closing also allergic to animal dander, pollen, dust. does not drink alcohol she has severe bilateral congestion, PND, loss of smell, and has trouble sleeping she denies significant shortness of breath she has tried nasal saline rinses with some efficacy   Environmental Allergies: yes Immunotherapy: no Smoker: no Asthma: no ASA sensitivity: YES History of Autoimmune Disease: No History of Immune Deficiency: No  Key Elements at least 4 described: Location: bilateral Severity: severe Quality: congestion Timing: constant Duration: years Modifying Factors: none Associated signs and symptoms: see above  2 month follow up s/p pred60 taper, budesonide felt better with budesonide but only last an hour still have significant congestion CT reviewed, DNS, patchy disease  3 mo f/u s/p bFESS on 12/22/2023 she does report some congestion and tonsilitis. she went to UC 2 weeks ago who gave her abx which improved her symptoms she has been less consistent with the budesonide overall doing well, smell has returned and better than preop  REVIEW OF SYSTEMS:  General ROS: negative for - chills, fatigue or fever Psychological ROS: negative for - anxiety or depression Ophthalmic ROS: negative for - blurry vision, decreased vision or double vision  ENT ROS: negative except as noted from HPI Allergy and Immunology ROS: negative except as noted from HPI Hematological and Lymphatic ROS: negative for - bleeding problems  Endocrine ROS: negative for - malaise/lethargy Respiratory ROS: negative for - stridor Cardiovascular ROS: negative for - chest pain Gastrointestinal ROS: negative for - appetite loss or nausea/vomiting Genitourinary ROS: negative for - incontinence Musculoskeletal ROS: negative for - gait disturbance  Neurological ROS: negative for - behavioral changes Dermatological ROS: negative for - nail changes  Physical Exam:  GENERAL APPEARANCE: Well-developed and No Acute Distress. COMMUNICATION: Able to Communicate. Strong Voice.  HEAD AND FACE Eyes: Testing of ocular motility including primary gaze alignment normal. Inspection and Appearance: No evidence of lesions or masses Palpation: Palpation of the face reveals no sinus tenderness Salivary Glands: Symmetric without masses Facial Strength: Symmetric without evidence of facial paralysis  EAR, NOSE, MOUTH, and THROAT: Ear Canals and Tympanic Membranes, Bilateral: No evidence of inflammation or lesions. Thresholds: Clinical speech reception thresholds normal. External, Nose and Auricle: No lesions or masses. Nasal, Mucosa, Septum, and Turbinates: See endoscopy.  NECK: Evaluation: No evidence of masses or crepitus. The neck is symmetric and the trachea is in the midline position. Thyroid: No evidence of enlargement, tenderness or mass. Neck Lymph Nodes: WNL. Respiratory: Inspection of the chest including symmetry, expansion and/or assessment of respiratory effort normal. Cardiovascular: Evaluation of peripheral vascular system by observation and palpation of capillary refill, normal. Neurological/Psychiatric: Alert, Oriented, Mood, and Affect Normal.  IMAGING:    PROCEDURE: Nasal endoscopy (59907)   SURGEON: Buddy Yang MD   Prior to the procedure, I had a discussion with the patient regarding the risks, benefits, and alternatives of the procedure and a verbal consent was obtained.   After obtaining adequate decongestion of the nasal mucosa with topical Epinephrine and adequate anesthesia with topical Lidocaine nasal spray, the nasal endoscope was used to examine the nasal passages and paranasal sinuses. The endoscope was passed along the floor of the nose bilaterally to evaluate the inferior meatus, floor of the nose, inferior turbinate, and nasopharynx. The scope was then passed superiorly to evaluate the area of the sphenoethmoidal recess, superior turbinate and superior meatus. As the scope was withdrawn anteriorly, the middle turbinate and middle meatus were carefully inspected. The endoscope was withdrawn and the patient tolerated the procedure well. No complications were encountered.   INSTRUMENTS: rigid 45   EXAM FINDINGS: very mild edema, all operated sinuses widely patent no polyps or mucopurulence  IMPRESSION: Ms. Moran is a pleasant 22 year old lady with NSAID sensitivity, DNS, CRSsNP, possible AERD s/p bFESS on 12/22/2023  PLAN: - continue NSI w/ budesonide daily  - RTC in 3 mo  Buddy Yang MD [facility] Rhinology and Skull Base Surgery Department of Otolaryngology- Head and Neck Surgery [facility]

## 2024-03-28 ENCOUNTER — APPOINTMENT (OUTPATIENT)
Dept: OTOLARYNGOLOGY | Facility: CLINIC | Age: 24
End: 2024-03-28
Payer: MEDICAID

## 2024-03-28 DIAGNOSIS — J32.1 CHRONIC FRONTAL SINUSITIS: ICD-10-CM

## 2024-03-28 DIAGNOSIS — J34.2 DEVIATED NASAL SEPTUM: ICD-10-CM

## 2024-03-28 DIAGNOSIS — R09.82 POSTNASAL DRIP: ICD-10-CM

## 2024-03-28 DIAGNOSIS — J32.0 CHRONIC MAXILLARY SINUSITIS: ICD-10-CM

## 2024-03-28 DIAGNOSIS — J32.3 CHRONIC SPHENOIDAL SINUSITIS: ICD-10-CM

## 2024-03-28 DIAGNOSIS — J03.90 ACUTE TONSILLITIS, UNSPECIFIED: ICD-10-CM

## 2024-03-28 DIAGNOSIS — J32.2 CHRONIC ETHMOIDAL SINUSITIS: ICD-10-CM

## 2024-03-28 PROCEDURE — 99214 OFFICE O/P EST MOD 30 MIN: CPT | Mod: 25

## 2024-03-28 PROCEDURE — 31575 DIAGNOSTIC LARYNGOSCOPY: CPT

## 2024-03-29 NOTE — HISTORY OF PRESENT ILLNESS
[de-identified] : CC: possible AERD  HISTORY OF PRESENT ILLNESS: Ms. Moran is a pleasant 22 year old lady with possible AERD she has had issues for over 6 years hx of DM2 on metformin, and of note, she had a reaction to ibuprofen with throat closing also allergic to animal dander, pollen, dust. does not drink alcohol she has severe bilateral congestion, PND, loss of smell, and has trouble sleeping she denies significant shortness of breath she has tried nasal saline rinses with some efficacy   Environmental Allergies: yes Immunotherapy: no Smoker: no Asthma: no ASA sensitivity: YES History of Autoimmune Disease: No History of Immune Deficiency: No  Key Elements at least 4 described: Location: bilateral Severity: severe Quality: congestion Timing: constant Duration: years Modifying Factors: none Associated signs and symptoms: see above  2 month follow up s/p pred60 taper, budesonide felt better with budesonide but only last an hour still have significant congestion CT reviewed, DNS, patchy disease  3 mo f/u s/p bFESS on 12/22/2023 she does report some congestion and tonsilitis. she went to UC 2 weeks ago who gave her abx which improved her symptoms she has been less consistent with the budesonide overall doing well, smell has returned and better than preop  3.5 mo f/u s/p bFESS on 12/22/2023 pt is here to discuss enlarged tonsils and sore throat she reports 2 episodes over the past 2 mo requiring 2 courses of abx and prednisone the L side is worse than the right she reports getting sick often and is frustrated with the chronic sore throat she is no longer taking metformin denies fevers, dysphagia  REVIEW OF SYSTEMS:  General ROS: negative for - chills, fatigue or fever Psychological ROS: negative for - anxiety or depression Ophthalmic ROS: negative for - blurry vision, decreased vision or double vision  ENT ROS: negative except as noted from HPI Allergy and Immunology ROS: negative except as noted from HPI Hematological and Lymphatic ROS: negative for - bleeding problems  Endocrine ROS: negative for - malaise/lethargy Respiratory ROS: negative for - stridor Cardiovascular ROS: negative for - chest pain Gastrointestinal ROS: negative for - appetite loss or nausea/vomiting Genitourinary ROS: negative for - incontinence Musculoskeletal ROS: negative for - gait disturbance  Neurological ROS: negative for - behavioral changes Dermatological ROS: negative for - nail changes  Physical Exam:  GENERAL APPEARANCE: Well-developed and No Acute Distress. COMMUNICATION: Able to Communicate. Strong Voice.  HEAD AND FACE Eyes: Testing of ocular motility including primary gaze alignment normal. Inspection and Appearance: No evidence of lesions or masses Palpation: Palpation of the face reveals no sinus tenderness Salivary Glands: Symmetric without masses Facial Strength: Symmetric without evidence of facial paralysis  EAR, NOSE, MOUTH, and THROAT: Ear Canals and Tympanic Membranes, Bilateral: No evidence of inflammation or lesions. Thresholds: Clinical speech reception thresholds normal. External, Nose and Auricle: No lesions or masses. Nasal, Mucosa, Septum, and Turbinates: See endoscopy.  NECK: Evaluation: No evidence of masses or crepitus. The neck is symmetric and the trachea is in the midline position. Thyroid: No evidence of enlargement, tenderness or mass. Neck Lymph Nodes: WNL. Respiratory: Inspection of the chest including symmetry, expansion and/or assessment of respiratory effort normal. Cardiovascular: Evaluation of peripheral vascular system by observation and palpation of capillary refill, normal. Neurological/Psychiatric: Alert, Oriented, Mood, and Affect Normal.  PROCEDURE: Flexible laryngoscopy with topical anesthesia (38650) ANESTHESIA: Topical with 4% Lidocaine   INDICATION FOR PROCEDURE: Unable to perform complete exam with mirror laryngoscopy   PREOPERATIVE DIAGNOSIS:   POSTOPERATIVE DIAGNOSIS:   SURGEON: Buddy Yang MD   Prior to the procedure, I had a discussion with the patient regarding the risks, benefits, and alternatives of the procedure and a verbal consent was obtained.   INSTRUMENTS: Flexible scope   OPERATIVE PROCEDURE NOTE:   Patient was taken to the endoscopy suite where the nose and the pharynx were anesthetized with topical Pontocaine in a spray form. After adequate anesthesia of the nasal cavity and the pharynx, the fiberoptic endoscope was inserted through the nasal cavity. The palate was identified for patency. The nasopharynx, oropharynx, hypopharynx and the larynx were examined, along with the base of tongue. Structural examination of vocal cord movement was carried out and the larynx was examined during phonation and deglutition. Gestures, such as cough, laughing and respiration were also performed to look for laryngeal abnormalities.   EXAM FINDINGS: tonsils 3+   IMPRESSION: Ms. Moran is a pleasant 22 year old lady with NSAID sensitivity, DNS, CRSsNP, possible AERD s/p bFESS on 12/22/2023, chronic tonsilitis   PLAN: - discussed option of tonsillectomy - will refer to allergy/immunology  Buddy Yang MD [facility] Rhinology and Skull Base Surgery Department of Otolaryngology- Head and Neck Surgery [facility]

## 2024-05-06 ENCOUNTER — APPOINTMENT (OUTPATIENT)
Dept: PEDIATRIC ALLERGY IMMUNOLOGY | Facility: CLINIC | Age: 24
End: 2024-05-06
Payer: MEDICAID

## 2024-05-06 PROCEDURE — 99204 OFFICE O/P NEW MOD 45 MIN: CPT

## 2024-05-06 NOTE — HISTORY OF PRESENT ILLNESS
[de-identified] : This telehealth visit was conducted using 2-way real time audiovisual technology. The visit was conducted by patient, Alexia Moran,located at [address] [place] [address] and provider,Dr. Kami Couch located at [address] [place] [address].  Verbal consent was provided by pateint.  Alexia is a 23 year old female with NSAIS sensitvity/AERD, PCOS and chronic tonsillitis s/p sinus surgery 2022  here for allergy teleheath visit   Notes recurrent tonsillitis  for several years. Tonsillitis recurs monthly- notes Strep  3 times in lifetime. Followed by ENT and discussed need for surgery. Wanted to have Immunology visit before she made a decsion for tonsillectomy. NOtes h/o sinsuitis- improved since having sinus surgery ( Dec 2022)  with polypectomy and deviated septum .Noted improved breathing (previously mouth breather) issues and snoring  No issues with sinus infection  since issues.  Does admit to +postnasal drip. Denies pneumonia or skin infections. No GI issues. .Admits to mild eczema. COVID x 2- 2020 and 2021. Received COVID vaccine x 3.  Notes black old in apt when she was 15-16 years of age- now removed.  PMhX: Notes idiopathic pancreatitis at 21 years of age. Does not drink alcohol. History of Mono- previous testing showed + titers- ? 15-18years of age with symptoms of headache and nausea.  PCOS- diagnosed at 18. Insulin resistanve- discussing Metformin. ADHD:diagnosed at 18 Disk bulge :diagnosed 2021  Meds: None Allergies: NSAID- problems breathing  (tightness in chest) .-no skin issues.  Currently works as an .  Env: Lives with parents & grandparent. + cat. No carpets.

## 2024-05-09 ENCOUNTER — LABORATORY RESULT (OUTPATIENT)
Age: 24
End: 2024-05-09

## 2024-05-10 LAB
A ALTERNATA IGE QN: <0.1 KUA/L
A FUMIGATUS IGE QN: <0.1 KUA/L
ALBUMIN SERPL ELPH-MCNC: 4.1 G/DL
ALP BLD-CCNC: 77 U/L
ALT SERPL-CCNC: 22 U/L
ANION GAP SERPL CALC-SCNC: 12 MMOL/L
AST SERPL-CCNC: 21 U/L
BASOPHILS # BLD AUTO: 0.04 K/UL
BASOPHILS NFR BLD AUTO: 0.4 %
BILIRUB SERPL-MCNC: 0.3 MG/DL
BUN SERPL-MCNC: 13 MG/DL
C ALBICANS IGE QN: <0.1 KUA/L
C HERBARUM IGE QN: <0.1 KUA/L
CALCIUM SERPL-MCNC: 9 MG/DL
CAT DANDER IGE QN: 64 KUA/L
CD16+CD56+ CELLS # BLD: 318 CELLS/UL
CD16+CD56+ CELLS NFR BLD: 8 %
CD19 CELLS NFR BLD: 712 CELLS/UL
CD3 CELLS # BLD: 2753 CELLS/UL
CD3 CELLS NFR BLD: 71 %
CD3+CD4+ CELLS # BLD: 1180 CELLS/UL
CD3+CD4+ CELLS NFR BLD: 30 %
CD3+CD4+ CELLS/CD3+CD8+ CLL SPEC: 0.96 RATIO
CD3+CD8+ CELLS # SPEC: 1232 CELLS/UL
CD3+CD8+ CELLS NFR BLD: 31 %
CELLS.CD3-CD19+/CELLS IN BLOOD: 19 %
CH50 SERPL-MCNC: 82 U/ML
CHLORIDE SERPL-SCNC: 103 MMOL/L
CO2 SERPL-SCNC: 24 MMOL/L
COMMON RAGWEED IGE QN: <0.1 KUA/L
CREAT SERPL-MCNC: 0.71 MG/DL
D FARINAE IGE QN: 1.59 KUA/L
D PTERONYSS IGE QN: 0.77 KUA/L
DEPRECATED A ALTERNATA IGE RAST QL: 0 (ref 0–?)
DEPRECATED A FUMIGATUS IGE RAST QL: 0 (ref 0–?)
DEPRECATED C ALBICANS IGE RAST QL: 0
DEPRECATED C HERBARUM IGE RAST QL: 0 (ref 0–?)
DEPRECATED CAT DANDER IGE RAST QL: 5 (ref 0–?)
DEPRECATED COMMON RAGWEED IGE RAST QL: 0 (ref 0–?)
DEPRECATED D FARINAE IGE RAST QL: 2 (ref 0–?)
DEPRECATED D PTERONYSS IGE RAST QL: 2 (ref 0–?)
DEPRECATED DOG DANDER IGE RAST QL: 3 (ref 0–?)
DEPRECATED KAPPA LC FREE/LAMBDA SER: 0.72 RATIO
DEPRECATED M RACEMOSUS IGE RAST QL: 0
DEPRECATED ROACH IGE RAST QL: 0 (ref 0–?)
DEPRECATED TIMOTHY IGE RAST QL: 0 (ref 0–?)
DEPRECATED WHITE OAK IGE RAST QL: 3 (ref 0–?)
EBV DNA SERPL NAA+PROBE-ACNC: NOT DETECTED IU/ML
EBV EA AB SER IA-ACNC: <5 U/ML
EBV EA AB TITR SER IF: POSITIVE
EBV EA IGG SER QL IA: 36.6 U/ML
EBV EA IGG SER-ACNC: NEGATIVE
EBV EA IGM SER IA-ACNC: NEGATIVE
EBV PATRN SPEC IB-IMP: NORMAL
EBV VCA IGG SER IA-ACNC: 32.9 U/ML
EBV VCA IGM SER QL IA: <10 U/ML
EBVPCR LOG: NOT DETECTED LOG10IU/ML
EGFR: 124 ML/MIN/1.73M2
EOSINOPHIL # BLD AUTO: 0.53 K/UL
EOSINOPHIL NFR BLD AUTO: 5.5 %
EPSTEIN-BARR VIRUS CAPSID ANTIGEN IGG: POSITIVE
GLUCOSE SERPL-MCNC: 155 MG/DL
HCT VFR BLD CALC: 38.8 %
HGB BLD-MCNC: 12.7 G/DL
IGA SER QL IEP: 212 MG/DL
IGE SER-MCNC: 213 KU/L
IGG SER QL IEP: 1130 MG/DL
IGM SER QL IEP: 123 MG/DL
IMM GRANULOCYTES NFR BLD AUTO: 0.3 %
KAPPA LC CSF-MCNC: 2.18 MG/DL
KAPPA LC SERPL-MCNC: 1.58 MG/DL
LYMPHOCYTES # BLD AUTO: 3.97 K/UL
LYMPHOCYTES NFR BLD AUTO: 41.1 %
M RACEMOSUS IGE QN: <0.1 KUA/L
MAN DIFF?: NORMAL
MCHC RBC-ENTMCNC: 28 PG
MCHC RBC-ENTMCNC: 32.7 GM/DL
MCV RBC AUTO: 85.7 FL
MONOCYTES # BLD AUTO: 0.58 K/UL
MONOCYTES NFR BLD AUTO: 6 %
NEUTROPHILS # BLD AUTO: 4.52 K/UL
NEUTROPHILS NFR BLD AUTO: 46.7 %
PLATELET # BLD AUTO: 367 K/UL
POTASSIUM SERPL-SCNC: 4.1 MMOL/L
PROT SERPL-MCNC: 6.9 G/DL
RBC # BLD: 4.53 M/UL
RBC # FLD: 13.5 %
ROACH IGE QN: <0.1 KUA/L
SODIUM SERPL-SCNC: 139 MMOL/L
T4 FREE SERPL-MCNC: 0.9 NG/DL
TIMOTHY IGE QN: <0.1 KUA/L
TSH SERPL-ACNC: 2.52 UIU/ML
WBC # FLD AUTO: 9.67 K/UL
WHITE OAK IGE QN: 5 KUA/L

## 2024-05-13 LAB — DOG DANDER IGE QN: 4.5 KUA/L

## 2024-05-14 LAB
C DIPHTHERIAE AB SER QL: 0.23 IU/ML
C TETANI IGG SER-ACNC: 0.14 IU/ML
COMPLEMENT, ALTERNATE PATHWAY (AH50): 84
DEPRECATED S PNEUM 1 IGG SER-MCNC: 2.9 MCG/ML
DEPRECATED S PNEUM12 AB SER-ACNC: 0.3 MCG/ML
DEPRECATED S PNEUM14 AB SER-ACNC: 3.2 MCG/ML
DEPRECATED S PNEUM17 IGG SER IA-MCNC: 2.5 MCG/ML
DEPRECATED S PNEUM18 IGG SER IA-MCNC: 1.6 MCG/ML
DEPRECATED S PNEUM19 IGG SER-MCNC: 3.8 MCG/ML
DEPRECATED S PNEUM19 IGG SER-MCNC: 4 MCG/ML
DEPRECATED S PNEUM2 IGG SER-MCNC: 2.1 MCG/ML
DEPRECATED S PNEUM20 IGG SER-MCNC: 8.1 MCG/ML
DEPRECATED S PNEUM22 IGG SER-MCNC: 1.7 MCG/ML
DEPRECATED S PNEUM23 AB SER-ACNC: 2.2 MCG/ML
DEPRECATED S PNEUM3 AB SER-ACNC: 0.5 MCG/ML
DEPRECATED S PNEUM34 IGG SER-MCNC: 2 MCG/ML
DEPRECATED S PNEUM4 AB SER-ACNC: 0.6 MCG/ML
DEPRECATED S PNEUM5 IGG SER-MCNC: 1.4 MCG/ML
DEPRECATED S PNEUM6 IGG SER-MCNC: 4 MCG/ML
DEPRECATED S PNEUM7 IGG SER-ACNC: 4.7 MCG/ML
DEPRECATED S PNEUM8 AB SER-ACNC: 3.1 MCG/ML
DEPRECATED S PNEUM9 AB SER-ACNC: 1.3 MCG/ML
DEPRECATED S PNEUM9 IGG SER-MCNC: 0.6 MCG/ML
IMMUNOLOGIST REVIEW: NORMAL
STREPTOCOCCUS PNEUMONIAE SEROTYPE 11A: 0.4 MCG/ML
STREPTOCOCCUS PNEUMONIAE SEROTYPE 15B: 4.3 MCG/ML
STREPTOCOCCUS PNEUMONIAE SEROTYPE 33F: 2.6 MCG/ML

## 2024-05-20 ENCOUNTER — APPOINTMENT (OUTPATIENT)
Dept: PEDIATRIC ALLERGY IMMUNOLOGY | Facility: CLINIC | Age: 24
End: 2024-05-20
Payer: MEDICAID

## 2024-05-20 PROCEDURE — 99214 OFFICE O/P EST MOD 30 MIN: CPT

## 2024-05-20 RX ORDER — ALBUTEROL SULFATE 90 UG/1
108 (90 BASE) INHALANT RESPIRATORY (INHALATION)
Qty: 1 | Refills: 1 | Status: ACTIVE | COMMUNITY
Start: 2024-05-20 | End: 1900-01-01

## 2024-05-20 NOTE — HISTORY OF PRESENT ILLNESS
[de-identified] : This telehealth visit was conducted using 2-way real time audiovisual technology. The visit was conducted by patient, Alexia Moran,located at [address] [place] [id number] and provider,Dr. Kami Couch located at [address] [place] [address].  Verbal consent was provided by pateint. Alexia is a 23 year old female with NSAID sensitvity/AERD, PCOS with insulin resistance and chronic tonsillitis s/p sinus surgery 2022 here for allergy follow up teleheath visit.  Interim Hx:Results reviewed with patient- + EBV IgG titers- no evidence of active EBV infection ( DNA PCR negative). Notes when in HS recalls previous ?MONO infection. Notes doing well- notes resp issues with wheezing in setting of flare of environmental allergies- has a cat. Aeroallergen serum testing : + tree,cat/dog,dust. Does not use an inhaler- recalls inhaler as an adoelscent- used with exercise but no active issues as an adult. Uses Zyrtec PRNand nasal saline rinses daily.   PREVIOUS VISIT NOTE: 5/6/24 Notes recurrent tonsillitis for several years. Tonsillitis recurs monthly- notes Strep 3 times in lifetime. Followed by ENT and discussed need for surgery. Wanted to have Immunology visit before she made a decsion for tonsillectomy. NOtes h/o sinsuitis- improved since having sinus surgery ( Dec 2022) with polypectomy and deviated septum.Noted improved breathing (previously mouth breather) issues and snoring No issues with sinus infection since issues. Does admit to +postnasal drip. Denies pneumonia or skin infections. No GI issues..Admits to mild eczema. COVID x 2- 2020 and 2021. Received COVID vaccine x 3. Notes black old in apt when she was 15-16 years of age- now removed.  PMhX: Notes idiopathic pancreatitis at 21 years of age. Does not drink alcohol. History of Mono- previous testing showed + titers- ? [age]years of age with symptoms of headache and nausea. PCOS- diagnosed at 18. Insulin resistanve- discussing Metformin. ADHD:diagnosed at 18 Disk bulge :diagnosed 2021  Meds: None Allergies: NSAID- problems breathing (tightness in chest).-no skin issues.  Currently works as an . Env: Lives with parents & grandparent. + cat. No carpets.

## 2024-06-14 ENCOUNTER — APPOINTMENT (OUTPATIENT)
Dept: OTOLARYNGOLOGY | Facility: CLINIC | Age: 24
End: 2024-06-14

## 2024-06-17 LAB
BASOPHILS # BLD AUTO: 0.05 K/UL
BASOPHILS NFR BLD AUTO: 0.5 %
CD16+CD56+ CELLS # BLD: 477 CELLS/UL
CD16+CD56+ CELLS NFR BLD: 12 %
CD19 CELLS NFR BLD: 648 CELLS/UL
CD3 CELLS # BLD: 2804 CELLS/UL
CD3 CELLS NFR BLD: 70 %
CD3+CD4+ CELLS # BLD: 1078 CELLS/UL
CD3+CD4+ CELLS NFR BLD: 27 %
CD3+CD4+ CELLS/CD3+CD8+ CLL SPEC: 0.86 RATIO
CD3+CD8+ CELLS # SPEC: 1252 CELLS/UL
CD3+CD8+ CELLS NFR BLD: 32 %
CELLS.CD3-CD19+/CELLS IN BLOOD: 16 %
EOSINOPHIL # BLD AUTO: 0.88 K/UL
EOSINOPHIL NFR BLD AUTO: 9.1 %
HCT VFR BLD CALC: 41.1 %
HGB BLD-MCNC: 13 G/DL
IMM GRANULOCYTES NFR BLD AUTO: 0.2 %
LYMPHOCYTES # BLD AUTO: 3.78 K/UL
LYMPHOCYTES NFR BLD AUTO: 39 %
MAN DIFF?: NORMAL
MCHC RBC-ENTMCNC: 27.4 PG
MCHC RBC-ENTMCNC: 31.6 GM/DL
MCV RBC AUTO: 86.7 FL
MONOCYTES # BLD AUTO: 0.68 K/UL
MONOCYTES NFR BLD AUTO: 7 %
NEUTROPHILS # BLD AUTO: 4.29 K/UL
NEUTROPHILS NFR BLD AUTO: 44.2 %
PLATELET # BLD AUTO: 380 K/UL
RBC # BLD: 4.74 M/UL
RBC # FLD: 12.9 %
WBC # FLD AUTO: 9.7 K/UL

## 2024-07-15 ENCOUNTER — APPOINTMENT (OUTPATIENT)
Dept: PEDIATRIC ALLERGY IMMUNOLOGY | Facility: CLINIC | Age: 24
End: 2024-07-15
Payer: MEDICAID

## 2024-07-15 PROCEDURE — 99214 OFFICE O/P EST MOD 30 MIN: CPT | Mod: 95

## 2024-07-19 ENCOUNTER — APPOINTMENT (OUTPATIENT)
Dept: OTOLARYNGOLOGY | Facility: CLINIC | Age: 24
End: 2024-07-19
Payer: MEDICAID

## 2024-07-19 DIAGNOSIS — J34.2 DEVIATED NASAL SEPTUM: ICD-10-CM

## 2024-07-19 DIAGNOSIS — J32.3 CHRONIC SPHENOIDAL SINUSITIS: ICD-10-CM

## 2024-07-19 DIAGNOSIS — J32.1 CHRONIC FRONTAL SINUSITIS: ICD-10-CM

## 2024-07-19 DIAGNOSIS — J32.0 CHRONIC MAXILLARY SINUSITIS: ICD-10-CM

## 2024-07-19 DIAGNOSIS — J30.81 ALLERGIC RHINITIS DUE TO ANIMAL (CAT) (DOG) HAIR AND DANDER: ICD-10-CM

## 2024-07-19 DIAGNOSIS — J32.2 CHRONIC ETHMOIDAL SINUSITIS: ICD-10-CM

## 2024-07-19 PROCEDURE — 31231 NASAL ENDOSCOPY DX: CPT

## 2024-07-19 PROCEDURE — 99214 OFFICE O/P EST MOD 30 MIN: CPT | Mod: 25

## 2024-07-19 RX ORDER — LEVOCETIRIZINE DIHYDROCHLORIDE 5 MG/1
5 TABLET ORAL DAILY
Qty: 90 | Refills: 3 | Status: ACTIVE | COMMUNITY
Start: 2024-07-19 | End: 1900-01-01

## 2024-07-19 RX ORDER — BUDESONIDE 0.5 MG/2ML
0.5 INHALANT ORAL
Qty: 360 | Refills: 3 | Status: ACTIVE | COMMUNITY
Start: 2024-07-19 | End: 1900-01-01

## 2025-06-06 ENCOUNTER — NON-APPOINTMENT (OUTPATIENT)
Age: 25
End: 2025-06-06

## (undated) DEVICE — ELCTR ECG CONDUCTIVE ADHESIVE

## (undated) DEVICE — BIOPSY FORCEP COLD DISP

## (undated) DEVICE — BITE BLOCK ADULT 20 X 27MM (GREEN)

## (undated) DEVICE — GOWN LG

## (undated) DEVICE — DRSG 2X2

## (undated) DEVICE — TUBING IV SET GRAVITY 3Y 100" MACRO

## (undated) DEVICE — UNDERPAD LINEN SAVER 17 X 24"

## (undated) DEVICE — LUBRICATING JELLY HR ONE SHOT 3G

## (undated) DEVICE — PACK IV START WITH CHG

## (undated) DEVICE — FACESHIELD FULL VISOR

## (undated) DEVICE — BIOPSY FORCEP RADIAL JAW 4 STANDARD WITH NEEDLE

## (undated) DEVICE — DRSG CURITY GAUZE SPONGE 4 X 4" 12-PLY NON-STERILE

## (undated) DEVICE — BASIN EMESIS 10IN GRADUATED MAUVE

## (undated) DEVICE — CONTAINER FORMALIN 80ML YELLOW

## (undated) DEVICE — TUBING MEDI-VAC W MAXIGRIP CONNECTORS 1/4"X6'

## (undated) DEVICE — CLAMP BX HOT RAD JAW 3

## (undated) DEVICE — DRSG BANDAID 0.75X3"

## (undated) DEVICE — LINE BREATHE SAMPLNG

## (undated) DEVICE — CATH IV SAFE BC 22G X 1" (BLUE)

## (undated) DEVICE — DENTURE CUP PINK

## (undated) DEVICE — SALIVA EJECTOR (BLUE)